# Patient Record
(demographics unavailable — no encounter records)

---

## 2025-05-02 NOTE — HISTORY OF PRESENT ILLNESS
[FreeTextEntry1] : Alejandro is a 45-year-old male, with a significant family history of prostate cancer in his father diagnosed at age 67, who presents for evaluation of progressively worsening erectile dysfunction.  He reports about a year ago he had difficulty both obtaining and maintaining erection which has been progressively worsening over the past 6 months to where he has difficulty obtaining or maintaining erection.  He reports decent libido and energy.  He has not tried PDE 5 inhibitors.  Overall voiding well without any complaints.  He denies any abdominal/flank pain, gross hematuria, dysuria, nausea/vomit, chills, fever, headaches, dizziness, further urological/constitutional symptoms.  Past medical history: Seasonal allergies: Medications: Prednisone, Zyrtec, Flonase Surgical history: Right knee meniscus repair Family history: Prostate cancer in his father diagnosed at age 67 Social history: Denies any former/current cigarette smoking, EtOH abuse, or illicit drug use.  Employed in [employer]. Allergies: Sulfa

## 2025-05-02 NOTE — ASSESSMENT
[FreeTextEntry1] : #ED: - Hormone panel, lipids, A1c - Start Cialis 5 mg 1 tab 2 hours prior to intercourse.  All usage, dosage, mechanism of action, adverse events reviewed.  Can titrate up to 20 mg.  He knows not to take more than 20 mg in the 36-hour period. The following was also discussed with the patient: Erectile dysfunction, its etiology, risk factors and natural history were discussed with the patient. Work-up and empiric management were discussed. From least to most invasive, treatments including behavioral changes (weight loss, exercise, improved sleep), oral PDE5i, vacuum erection device, intraurethral pellets, intracavernosal injections, and penile prosthetic implantation were described. Relevant individual risks and benefits disclosed. I explained that there are different causes for ED including psychogenic, vasculogenic, neurogenic and medication side-effect related causes. Oftentimes there are multiple causes.   The patient understands that the risks of PDE5is include facial redness, flushing, GERD, back pain, priapism, chest pain/MI, arrhythmia, dizziness, drop in BP, impaired vision and loss of hearing. He understands that the medication may take up to an hour to function and requires sexual stimulation. He was told not to take his medication within 4 hours of alpha blockers, or not at all if ever taking nitroglycerin. Both sildenafil and vardenafil, but not tadalafil, have some cross-reactivity with PDE6 and thus may produce visual side effects. He also was told to go to the ER immediately if he noticed any blindness or visual changes, or for any painful erection lasting > 4 hrs. He denies any history nitrate medication use for angina.  #Family history of prostate cancer: - PSA testing Today I discussed the risks and benefits of PSA screening. There are a number of benign conditions including UTI, BPH, tovar catheter, certain activities and prostatitis that will increase PSA in the absence of cancer. Unfortunately, the only way to definitively diagnose cancer is with a prostate biopsy. I discussed the method of performing biopsy (transperineal with anesthesia) and the risks (bleeding, infection, urinary retention, ED). In addition to this, the patient and I discussed the discrepancy between the prevalence of prostate cancer and the prevalence of death from prostate cancer.  Prostate cancer is the most common solid tumor in [place] men. However, I mentioned how it can be very slow growing, many men with prostate cancer will die with the disease rather than from it.   -Follow-up few weeks for review

## 2025-05-02 NOTE — PLAN
[TextEntry] : The patient was seen and examined with the PA/NP/RN/Resident. I agree with the assessment/plan and made any necessary adjustments.  The submitted E/M billing level for this visit reflects the total time spent on the day of the visit including face-to-face time spent with the patient, non-face-to-face review of medical records and relevant information, documentation, and asynchronous communication with the patient after a visit via phone, email, or patients EHR portal after the visit. The medical records reviewed are either scanned into the chart or reviewed with the patient using a patient's electronic medical records portal for patients with records not available to [facility] via electronic transmission platforms from other institutions and labs.   I have reviewed and verified information regarding the chief complaint and history recorded by the ancillary staff and/or the patient. I have independently reviewed and interpreted tests performed by other physicians and facilities as necessary. I have discussed with the patient differential diagnosis, reason for auxiliary tests if ordered, risks, benefits, alternatives, and complications of each form of therapy were discussed. Social determinants of disease were assessed and necessary measures implemented.   Time spent counseling and performing coordination of care was also included in determining the appropriate EM billing level.

## 2025-06-06 NOTE — HISTORY OF PRESENT ILLNESS
[FreeTextEntry1] : Alejnadro is a 45-year-old male, with a significant family history of prostate cancer in his father diagnosed at age 67, who presents for evaluation of progressively worsening erectile dysfunction.  He reports about a year ago he had difficulty both obtaining and maintaining erection which has been progressively worsening over the past 6 months to where he has difficulty obtaining or maintaining erection.  He reports decent libido and energy.  He has not tried PDE 5 inhibitors.  Overall voiding well without any complaints.  He denies any abdominal/flank pain, gross hematuria, dysuria, nausea/vomit, chills, fever, headaches, dizziness, further urological/constitutional symptoms.  Past medical history: Seasonal allergies: Medications: Prednisone, Zyrtec, Flonase Surgical history: Right knee meniscus repair Family history: Prostate cancer in his father diagnosed at age 67 Social history: Denies any former/current cigarette smoking, EtOH abuse, or illicit drug use.  Employed in [employer]. Allergies: Sulfa  Office visit from 6/6/2025: Patient had excellent response to Cialis 10 mg, taking two 5 mg tablet.  Wishes to obtain prescription for 10 mg tablets.   Overall voiding well without any complaints.  He denies any abdominal/flank pain, gross hematuria, dysuria, nausea/vomit, chills, fever, headaches, dizziness, further urological/constitutional symptoms.  Lab work from 5/29/2025: CBC demonstrates mild anemia with a hemoglobin of 13.9 with a hematocrit of 43.5 Hemoglobin A1c 5.7 Liver function testing within normal limits Estradiol 19 Total cholesterol 2041 Triglycerides 85 HDL cholesterol 78  Vitamin D 25 PSA 0.63 LH 4.8 Prolactin 9.3 Total testosterone 153 Bioavailable 87.9

## 2025-06-06 NOTE — ASSESSMENT
[FreeTextEntry1] : #ED: -Significantly improved with Cialis 10 mg.  Will send prescription for Cialis 10 mg.  Can take 1 tablet 2 hours prior to intercourse.  Knows nothing more than 20 mg at 36-hour period. The following was also discussed with the patient: Erectile dysfunction, its etiology, risk factors and natural history were discussed with the patient. Work-up and empiric management were discussed. From least to most invasive, treatments including behavioral changes (weight loss, exercise, improved sleep), oral PDE5i, vacuum erection device, intraurethral pellets, intracavernosal injections, and penile prosthetic implantation were described. Relevant individual risks and benefits disclosed. I explained that there are different causes for ED including psychogenic, vasculogenic, neurogenic and medication side-effect related causes. Oftentimes there are multiple causes.   The patient understands that the risks of PDE5is include facial redness, flushing, GERD, back pain, priapism, chest pain/MI, arrhythmia, dizziness, drop in BP, impaired vision and loss of hearing. He understands that the medication may take up to an hour to function and requires sexual stimulation. He was told not to take his medication within 4 hours of alpha blockers, or not at all if ever taking nitroglycerin. Both sildenafil and vardenafil, but not tadalafil, have some cross-reactivity with PDE6 and thus may produce visual side effects. He also was told to go to the ER immediately if he noticed any blindness or visual changes, or for any painful erection lasting > 4 hrs. He denies any history nitrate medication use for angina.   #Family history of prostate cancer: - PSA well within normal limits - Follow-up 1 year PSA testing

## 2025-06-06 NOTE — PLAN
[TextEntry] : The patient was seen and examined with the PA/NP/RN/Resident. I agree with the assessment/plan and made any necessary adjustments.  The submitted E/M billing level for this visit reflects the total time spent on the day of the visit including face-to-face time spent with the patient, non-face-to-face review of medical records and relevant information, documentation, and asynchronous communication with the patient after a visit via phone, email, or patients EHR portal after the visit. The medical records reviewed are either scanned into the chart or reviewed with the patient using a patient's electronic medical records portal for patients with records not available to [facility] via electronic transmission platforms from other institutions and labs.   I have reviewed and verified information regarding the chief complaint and history recorded by the ancillary staff and/or the patient. I have independently reviewed and interpreted tests performed by other physicians and facilities as necessary. I have discussed with the patient differential diagnosis, reason for auxiliary tests if ordered, risks, benefits, alternatives, and complications of each form of therapy were discussed. Social determinants of disease were assessed and necessary measures implemented.   Time spent counseling and performing coordination of care was also included in determining the appropriate EM billing level.